# Patient Record
Sex: FEMALE | Race: ASIAN
[De-identification: names, ages, dates, MRNs, and addresses within clinical notes are randomized per-mention and may not be internally consistent; named-entity substitution may affect disease eponyms.]

---

## 2017-01-23 ENCOUNTER — HOSPITAL ENCOUNTER (OUTPATIENT)
Dept: HOSPITAL 45 - C.LAB1850 | Age: 82
Discharge: HOME | End: 2017-01-23
Attending: INTERNAL MEDICINE
Payer: COMMERCIAL

## 2017-01-23 DIAGNOSIS — E11.9: Primary | ICD-10-CM

## 2017-01-23 LAB — EST. AVERAGE GLUCOSE BLD GHB EST-MCNC: 128 MG/DL

## 2017-03-07 ENCOUNTER — HOSPITAL ENCOUNTER (OUTPATIENT)
Dept: HOSPITAL 45 - C.MAMM | Age: 82
Discharge: HOME | End: 2017-03-07
Attending: OBSTETRICS & GYNECOLOGY
Payer: COMMERCIAL

## 2017-03-07 DIAGNOSIS — Z12.31: Primary | ICD-10-CM

## 2017-03-08 NOTE — MAMMOGRAPHY REPORT
BILATERAL DIGITAL SCREENING MAMMOGRAM WITH CAD: 3/7/2017

CLINICAL HISTORY: Routine screening examination.  





TECHNIQUE: Bilateral CC and MLO views were obtained.  Current study was also evaluated with a Comput
er Aided Detection (CAD) system.  



COMPARISON: Comparison is made to exams dated:  3/4/2016 mammogram, 3/3/2015 mammogram, 10/3/2013 ma
mmogram - [facility], 9/22/2008, 9/23/2009, and 9/26/2011 mammogram - [facility].   



BREAST COMPOSITION:  There are scattered areas of fibroglandular density in both breasts.  



FINDINGS: The parenchyma pattern is similar to prior exams.  A prominent right axillary lymph node i
s again seen, similar in size comparing to the 2013 mammogram and previously sampled with fine-needl
e aspiration which yielded benign cytology.  No new suspicious mass, architectural distortion or clu
ster of microcalcifications is seen.  



IMPRESSION:  ACR BI-RADS CATEGORY 1: NEGATIVE

There is no mammographic evidence of malignancy. A 1 year screening mammogram is recommended.  The p
atient will receive written notification of the results.  





Approximately 10% of breast cancers are not detected with mammography. A negative mammographic repor
t should not delay biopsy if a clinically suggestive mass is present.



Denisse Anne M.D.          

ay/:3/7/2017 15:25:20  



Imaging Technologist: Maxine PECK)(RHIANNON), [facility]

letter sent: Normal 1/2  

BI-RADS Code: ACR BI-RADS Category 1: Negative

## 2017-05-09 ENCOUNTER — HOSPITAL ENCOUNTER (OUTPATIENT)
Dept: HOSPITAL 45 - C.LAB1850 | Age: 82
Discharge: HOME | End: 2017-05-09
Attending: INTERNAL MEDICINE
Payer: COMMERCIAL

## 2017-05-09 DIAGNOSIS — E11.9: Primary | ICD-10-CM

## 2017-05-09 LAB
ALT SERPL-CCNC: 19 U/L (ref 12–78)
ANION GAP SERPL CALC-SCNC: 5 MMOL/L (ref 3–11)
APPEARANCE UR: CLEAR
AST SERPL-CCNC: 13 U/L (ref 15–37)
BASOPHILS # BLD: 0.02 K/UL (ref 0–0.2)
BASOPHILS NFR BLD: 0.4 %
BILIRUB UR-MCNC: (no result) MG/DL
BUN SERPL-MCNC: 13 MG/DL (ref 7–18)
BUN/CREAT SERPL: 27.5 (ref 10–20)
CALCIUM SERPL-MCNC: 8.9 MG/DL (ref 8.5–10.1)
CHLORIDE SERPL-SCNC: 99 MMOL/L (ref 98–107)
CHOLEST/HDLC SERPL: 3.3 {RATIO}
CO2 SERPL-SCNC: 31 MMOL/L (ref 21–32)
COLOR UR: YELLOW
COMPLETE: YES
CREAT SERPL-MCNC: 0.48 MG/DL (ref 0.6–1.2)
EOSINOPHIL NFR BLD AUTO: 198 K/UL (ref 130–400)
EST. AVERAGE GLUCOSE BLD GHB EST-MCNC: 137 MG/DL
GLUCOSE SERPL-MCNC: 117 MG/DL (ref 70–99)
GLUCOSE UR QL: 71 MG/DL
HCT VFR BLD CALC: 42.3 % (ref 37–47)
IG%: 0 %
IMM GRANULOCYTES NFR BLD AUTO: 41.1 %
KETONES UR QL STRIP: 144 MG/DL
LYMPHOCYTES # BLD: 2.33 K/UL (ref 1.2–3.4)
MANUAL MICROSCOPIC REQUIRED?: NO
MCH RBC QN AUTO: 29 PG (ref 25–34)
MCHC RBC AUTO-ENTMCNC: 33.8 G/DL (ref 32–36)
MCV RBC AUTO: 85.8 FL (ref 80–100)
MONOCYTES NFR BLD: 13.8 %
NEUTROPHILS # BLD AUTO: 0.9 %
NEUTROPHILS NFR BLD AUTO: 43.8 %
NITRITE UR QL STRIP: (no result)
NITRITE UR QL STRIP: 85 MG/DL (ref 0–150)
PH UR STRIP: 7 [PH] (ref 4.5–7.5)
PH UR: 232 MG/DL (ref 0–200)
PMV BLD AUTO: 9.4 FL (ref 7.4–10.4)
POTASSIUM SERPL-SCNC: 4.1 MMOL/L (ref 3.5–5.1)
RBC # BLD AUTO: 4.93 M/UL (ref 4.2–5.4)
REVIEW REQ?: NO
SODIUM SERPL-SCNC: 135 MMOL/L (ref 136–145)
SP GR UR STRIP: 1.01 (ref 1–1.03)
TSH SERPL-ACNC: 3.25 UIU/ML (ref 0.3–4.5)
URINE EPITHELIAL CELL AUTO: (no result) /LPF (ref 0–5)
UROBILINOGEN UR-MCNC: (no result) MG/DL
VERY LOW DENSITY LIPOPROT CALC: 17 MG/DL
WBC # BLD AUTO: 5.67 K/UL (ref 4.8–10.8)

## 2017-08-22 ENCOUNTER — HOSPITAL ENCOUNTER (OUTPATIENT)
Dept: HOSPITAL 45 - X.SURG | Age: 82
Discharge: HOME | End: 2017-08-22
Attending: OPHTHALMOLOGY
Payer: COMMERCIAL

## 2017-08-22 VITALS — SYSTOLIC BLOOD PRESSURE: 145 MMHG | DIASTOLIC BLOOD PRESSURE: 56 MMHG | HEART RATE: 80 BPM | OXYGEN SATURATION: 97 %

## 2017-08-22 VITALS
HEIGHT: 62.01 IN | HEIGHT: 62.01 IN | WEIGHT: 144.8 LBS | BODY MASS INDEX: 26.31 KG/M2 | WEIGHT: 144.8 LBS | BODY MASS INDEX: 26.31 KG/M2

## 2017-08-22 DIAGNOSIS — I10: ICD-10-CM

## 2017-08-22 DIAGNOSIS — H26.9: Primary | ICD-10-CM

## 2017-08-22 DIAGNOSIS — E11.9: ICD-10-CM

## 2017-08-22 DIAGNOSIS — E03.9: ICD-10-CM

## 2017-08-22 DIAGNOSIS — E78.5: ICD-10-CM

## 2017-08-22 DIAGNOSIS — E78.00: ICD-10-CM

## 2017-08-22 RX ADMIN — PHENYLEPHRINE HYDROCHLORIDE SCH DROP: 25 SOLUTION/ DROPS OPHTHALMIC at 08:02

## 2017-08-22 RX ADMIN — CYCLOPENTOLATE HYDROCHLORIDE SCH DROP: 10 SOLUTION/ DROPS OPHTHALMIC at 07:59

## 2017-08-22 RX ADMIN — CYCLOPENTOLATE HYDROCHLORIDE SCH DROP: 10 SOLUTION/ DROPS OPHTHALMIC at 08:04

## 2017-08-22 RX ADMIN — PHENYLEPHRINE HYDROCHLORIDE SCH DROP: 25 SOLUTION/ DROPS OPHTHALMIC at 07:57

## 2017-08-22 RX ADMIN — KETOROLAC TROMETHAMINE SCH DROP: 5 SOLUTION OPHTHALMIC at 08:05

## 2017-08-22 RX ADMIN — TROPICAMIDE SCH DROP: 10 SOLUTION/ DROPS OPHTHALMIC at 07:58

## 2017-08-22 RX ADMIN — KETOROLAC TROMETHAMINE SCH DROP: 5 SOLUTION OPHTHALMIC at 08:00

## 2017-08-22 RX ADMIN — TROPICAMIDE SCH DROP: 10 SOLUTION/ DROPS OPHTHALMIC at 08:03

## 2017-08-22 RX ADMIN — GATIFLOXACIN SCH DROP: 5 SOLUTION/ DROPS OPHTHALMIC at 08:01

## 2017-08-22 RX ADMIN — GATIFLOXACIN SCH DROP: 5 SOLUTION/ DROPS OPHTHALMIC at 08:14

## 2017-08-22 NOTE — DISCHARGE INSTRUCTIONS-SURGCTR
Discharge Instructions


Date of Service


Aug 22, 2017.





Visit


Reason for Visit:  Left Cataract





Discharge


Discharge Diagnosis / Problem:  cataract





Discharge Goals


Goal(s):  Improve function





Activity Recommendations


Activity Limitations:  per Instructions/Follow-up section





Anesthesia


.





Post Anesthesia Instructions:





If you have had General Anesthesia or IV Sedation:





*  Do not drive today.


*  Resume driving when surgeon permits.


*  Do not make important decisions or sign legal documents today.


*  Call surgeon for:





   1.  Temperature elevations greater than 101 degrees F.


   2.  Uncontrollable pain.


   3.  Excessive bleeding.


   4.  Persistent nausea and vomiting.


   5.  Medication intolerance (nausea, vomiting or rash).





*  For nausea and vomiting use only clear liquids such as: tea, soda, bouillon 

until nausea subsides, then gradually increase diet as tolerated.





*  If you have any concerns or questions, call your surgeon's office.  If 

physician is unavailable and it is an emergency, call 911 or go to the nearest 

emergency room.





.





Instructions / Follow-Up


Instructions / Follow-Up





ACTIVITY RECOMMENDATIONS:





*  No strenuous lifting, jogging or running for 4 days





*  No swimming or yard work for 1 week.





*  Limited bending is permitted, such as putting on shoes.








RETURN TO SCHOOL/WORK:





No work until seen by physician in office.








MEDICATIONS:





Resume previous medications unless instructed otherwise by your surgeon.  This


includes eye drops for glaucoma.





Zymaxid/Gatifloxacin (tan cap) - one drop every 2 hours until bedtime





Nevanac/Ilevro/Prolensa/Ketorolac (gray cap) - one drop every 4 hours until 

bedtime





Prednisolone/Durezol (white/pink cap, SHAKE WELL) - one drop every 2 hours 

until bedtime





Starting tomorrow - all 3 drops every 4 hours until seen in the office





Optive drops - as needed for discomfort








SPECIAL CARE INSTRUCTIONS:





*  Wear eyeshield when sleeping, for four nights.





*  You may wear your own glasses or sunglasses while awake.





*  You may read or watch TV





*  You may shower and wash your face, but be gentle around the eye and pat dry.





*  Blurry vision and mild irritation are normal.





*  Call office if pain is more severe or vision becomes dark at [phone].








FOLLOW UP VISIT:





Follow-up with Dr Gould tomorrow.





Diet Recommendations


Home Diet:  resume previous diet





Procedures


Procedures Performed:  


Left Cataract Phacoemulsification With Intraocular Lens Implant





Pending Studies


Studies pending at discharge:  no





Medical Emergencies








.


Who to Call and When:





Medical Emergencies:  If at any time you feel your situation is an emergency, 

please call 911 immediately.





.





Non-Emergent Contact


Non-Emergency issues call your:  Ophthalmologist





.


.








"Provider Documentation" section prepared by Alireza Gould.








.

## 2017-08-22 NOTE — ANESTHESIA PROGRESS NT - [facility]
Anesthesia Post Op Note


Date & Time


Aug 22, 2017 at 09:13





Vital Signs


Pain Intensity:  0





Vital Signs Past 12 Hours








  Date Time  Temp Pulse Resp B/P (MAP) Pulse Ox O2 Delivery O2 Flow Rate FiO2


 


8/22/17 07:45 36.7 89 16 169/80 (109) 99 Room Air  











Notes


Mental Status:  alert / awake / arousable, participated in evaluation


Pt Amnestic to Procedure:  Yes


Nausea / Vomiting:  adequately controlled


Pain:  adequately controlled


Airway Patency, RR, SpO2:  stable & adequate


BP & HR:  stable & adequate


Hydration State:  stable & adequate


Anesthetic Complications:  no major complications apparent

## 2017-08-22 NOTE — HISTORY & PHYSICAL BRIDGE - [facility]
H&P Re-Evaluation


Bridge Note:


I have examined the patient, reviewed the History & Physical and in the 

interval since the performance of the History & Physical I have noted the 

following changes of clinical significance:


Diagnosis: Left Cataract





Procedure:  Left Cataract Removal with Lens Implant








 No changes noted

## 2017-08-22 NOTE — [facility] OPERATIVE REPORT
Operative Report


Date of Service


Aug 22, 2017.





Operative Report


1.  PREOPERATIVE DIAGNOSIS:  Cataract of the left eye.





2.  POSTOPERATIVE DIAGNOSIS:  Same.





3.  PROCEDURE:  Phacoemulsification with intraocular lens implantation of the 

left eye.  





SURGEON:  Dr. Alireza Gould.  ANESTHESIA:  Topical Lidocaine gel, 1% Non-

Preserved intracameral Lidocaine, and monitored intravenous sedation.  





INDICATIONS FOR THE PROCEDURE:  The patient is a 83 - year-old female with a 

history of cataract of the left eye causing significant visual impairment.  The 

details of the proposed procedure were explained to the patient who asked 

appropriate questions and following discussion of all risks, benefits and 

alternatives agreed to have the procedure done.  





4.  OPERATION AND FINDINGS:  





DESCRIPTION OF PROCEDURE:  After informed consent was obtained, the patient was 

brought to the Operating Room at the [facility].  The 

patient was placed in a supine position and then the left eye was prepped and 

draped in the usual sterile fashion for intraocular surgery.  A drop of topical 

Lidocaine gel was placed in the operative eye.  A wire lid speculum was then 

placed in the fornices.  A corneal paracentesis was then created temporally.  

The Non-Preserved Lidocaine was then instilled into the anterior chamber.  The 

anterior chamber was then pressurized with viscoelastic.  A 2.0 mm clear 

corneal incision was then created temporally.  A cystotome was inserted into 

the anterior chamber and used to create a tear in the anterior lens capsule.  

This capsular tear was then used to create a small flap and the flap was 

dragged in a counterclockwise direction in order to create a continuous 

curvilinear capsulorrhexis.  Hydrodissection was accomplished with balanced 

salt solution.  Phacoemulsification of the lens nucleus was then performed in a 

standard divide-and-conquer technique.  The phaco time was 26 seconds with an 

average power of 13 %.  The remaining cortical material was removed using 

irrigation aspiration.  The capsular bag was then filled with viscoelastic.  A 

Bausch & Lomb MI60L +16.5 diopters lens was then loaded into the injector and 

injected into the capsular bag.  The remaining viscoelastic was removed with 

the irrigation aspiration handpiece.  The wound was hydrated and then checked 

and found to be watertight.  The intraocular pressure was checked and found to 

be adequate.  The wire lid speculum was removed and the patient's face was 

cleaned and dried.  TobraDex ointment was placed in the inferior fornix.  The 

patient was discharged to the Recovery Room having tolerated the procedure 

well.  There were no complications.  The patient will be seen tomorrow in the 

office for follow-up.


I attest to the content of the Intraoperative Record and any orders documented 

therein.  Any exceptions are noted below.

## 2017-09-12 ENCOUNTER — HOSPITAL ENCOUNTER (OUTPATIENT)
Dept: HOSPITAL 45 - X.SURG | Age: 82
Discharge: HOME | End: 2017-09-12
Attending: OPHTHALMOLOGY
Payer: COMMERCIAL

## 2017-09-12 VITALS
BODY MASS INDEX: 26.22 KG/M2 | HEIGHT: 62.01 IN | WEIGHT: 144.29 LBS | HEIGHT: 62.01 IN | BODY MASS INDEX: 26.22 KG/M2 | WEIGHT: 144.29 LBS

## 2017-09-12 VITALS — OXYGEN SATURATION: 99 % | HEART RATE: 75 BPM | SYSTOLIC BLOOD PRESSURE: 129 MMHG | DIASTOLIC BLOOD PRESSURE: 76 MMHG

## 2017-09-12 VITALS — TEMPERATURE: 97.52 F

## 2017-09-12 DIAGNOSIS — E78.00: ICD-10-CM

## 2017-09-12 DIAGNOSIS — E03.9: ICD-10-CM

## 2017-09-12 DIAGNOSIS — I87.2: ICD-10-CM

## 2017-09-12 DIAGNOSIS — H40.9: ICD-10-CM

## 2017-09-12 DIAGNOSIS — E11.9: ICD-10-CM

## 2017-09-12 DIAGNOSIS — Z79.899: ICD-10-CM

## 2017-09-12 DIAGNOSIS — I10: ICD-10-CM

## 2017-09-12 DIAGNOSIS — H26.9: Primary | ICD-10-CM

## 2017-09-12 RX ADMIN — KETOROLAC TROMETHAMINE SCH DROP: 5 SOLUTION OPHTHALMIC at 12:08

## 2017-09-12 RX ADMIN — CYCLOPENTOLATE HYDROCHLORIDE SCH DROP: 10 SOLUTION/ DROPS OPHTHALMIC at 12:12

## 2017-09-12 RX ADMIN — KETOROLAC TROMETHAMINE SCH DROP: 5 SOLUTION OPHTHALMIC at 12:13

## 2017-09-12 RX ADMIN — PHENYLEPHRINE HYDROCHLORIDE SCH DROP: 25 SOLUTION/ DROPS OPHTHALMIC at 12:10

## 2017-09-12 RX ADMIN — TROPICAMIDE SCH DROP: 10 SOLUTION/ DROPS OPHTHALMIC at 12:11

## 2017-09-12 RX ADMIN — PHENYLEPHRINE HYDROCHLORIDE SCH DROP: 25 SOLUTION/ DROPS OPHTHALMIC at 12:05

## 2017-09-12 RX ADMIN — CYCLOPENTOLATE HYDROCHLORIDE SCH DROP: 10 SOLUTION/ DROPS OPHTHALMIC at 12:07

## 2017-09-12 RX ADMIN — TROPICAMIDE SCH DROP: 10 SOLUTION/ DROPS OPHTHALMIC at 12:06

## 2017-09-12 RX ADMIN — GATIFLOXACIN SCH DROP: 5 SOLUTION/ DROPS OPHTHALMIC at 12:09

## 2017-09-12 RX ADMIN — GATIFLOXACIN SCH DROP: 5 SOLUTION/ DROPS OPHTHALMIC at 12:20

## 2017-09-12 NOTE — [facility] OPERATIVE REPORT
Operative Report


Date of Service


Sep 12, 2017.





Operative Report


1.  PREOPERATIVE DIAGNOSIS:  Cataract of the right eye.





2.  POSTOPERATIVE DIAGNOSIS:  Same.





3.  PROCEDURE:  Phacoemulsification with intraocular lens implantation of the 

right eye.  





SURGEON:  Dr. Alireza Gould.  ANESTHESIA:  Topical Lidocaine gel, 1% Non-

Preserved intracameral Lidocaine, and monitored intravenous sedation.  





INDICATIONS FOR THE PROCEDURE:  The patient is a 83 - year-old female with a 

history of cataract of the right eye causing significant visual impairment.  

The details of the proposed procedure were explained to the patient who asked 

appropriate questions and following discussion of all risks, benefits and 

alternatives agreed to have the procedure done.  





4.  OPERATION AND FINDINGS:  





DESCRIPTION OF PROCEDURE:  After informed consent was obtained, the patient was 

brought to the Operating Room at the [facility].  The 

patient was placed in a supine position and then the right eye was prepped and 

draped in the usual sterile fashion for intraocular surgery.  A drop of topical 

Lidocaine gel was placed in the operative eye.  A wire lid speculum was then 

placed in the fornices.  A corneal paracentesis was then created temporally.  

The Non-Preserved Lidocaine was then instilled into the anterior chamber.  The 

anterior chamber was then pressurized with viscoelastic.  A 2.0 mm clear 

corneal incision was then created temporally.  A cystotome was inserted into 

the anterior chamber and used to create a tear in the anterior lens capsule.  

This capsular tear was then used to create a small flap and the flap was 

dragged in a counterclockwise direction in order to create a continuous 

curvilinear capsulorrhexis.  Hydrodissection was accomplished with balanced 

salt solution.  Phacoemulsification of the lens nucleus was then performed in a 

standard divide-and-conquer technique.  The phaco time was 28 seconds with an 

average power of 19 %.  The remaining cortical material was removed using 

irrigation aspiration.  The capsular bag was then filled with viscoelastic.  A 

Bausch & Lomb MI60L +16.5 diopters lens was then loaded into the injector and 

injected into the capsular bag.  The remaining viscoelastic was removed with 

the irrigation aspiration handpiece.  The wound was hydrated and then checked 

and found to be watertight.  The intraocular pressure was checked and found to 

be adequate.  The wire lid speculum was removed and the patient's face was 

cleaned and dried.  TobraDex ointment was placed in the inferior fornix.  The 

patient was discharged to the Recovery Room having tolerated the procedure 

well.  There were no complications.  The patient will be seen tomorrow in the 

office for follow-up.


I attest to the content of the Intraoperative Record and any orders documented 

therein.  Any exceptions are noted below.

## 2017-09-12 NOTE — DISCHARGE INSTRUCTIONS-SURGCTR
Discharge Instructions


Date of Service


Sep 12, 2017.





Visit


Reason for Visit:  Right Cataract





Discharge


Discharge Diagnosis / Problem:  cataract





Discharge Goals


Goal(s):  Improve function





Activity Recommendations


Activity Limitations:  per Instructions/Follow-up section





Anesthesia


.





Post Anesthesia Instructions:





If you have had General Anesthesia or IV Sedation:





*  Do not drive today.


*  Resume driving when surgeon permits.


*  Do not make important decisions or sign legal documents today.


*  Call surgeon for:





   1.  Temperature elevations greater than 101 degrees F.


   2.  Uncontrollable pain.


   3.  Excessive bleeding.


   4.  Persistent nausea and vomiting.


   5.  Medication intolerance (nausea, vomiting or rash).





*  For nausea and vomiting use only clear liquids such as: tea, soda, bouillon 

until nausea subsides, then gradually increase diet as tolerated.





*  If you have any concerns or questions, call your surgeon's office.  If 

physician is unavailable and it is an emergency, call 911 or go to the nearest 

emergency room.





.





Instructions / Follow-Up


Instructions / Follow-Up





ACTIVITY RECOMMENDATIONS:





*  No strenuous lifting, jogging or running for 4 days





*  No swimming or yard work for 1 week.





*  Limited bending is permitted, such as putting on shoes.








RETURN TO SCHOOL/WORK:





No work until seen by physician in office.








MEDICATIONS:





Resume previous medications unless instructed otherwise by your surgeon.  This


includes eye drops for glaucoma.





Zymaxid/Gatifloxacin (tan cap) - one drop every 2 hours until bedtime





Nevanac/Ilevro/Prolensa/Ketorolac (gray cap) - one drop every 4 hours until 

bedtime





Prednisolone/Durezol (white/pink cap, SHAKE WELL) - one drop every 2 hours 

until bedtime





Starting tomorrow - all 3 drops every 4 hours until seen in the office





Optive drops - as needed for discomfort








SPECIAL CARE INSTRUCTIONS:





*  Wear eyeshield when sleeping, for four nights.





*  You may wear your own glasses or sunglasses while awake.





*  You may read or watch TV





*  You may shower and wash your face, but be gentle around the eye and pat dry.





*  Blurry vision and mild irritation are normal.





*  Call office if pain is more severe or vision becomes dark at [phone].








FOLLOW UP VISIT:





Follow-up with Dr Gould tomorrow.





Diet Recommendations


Home Diet:  resume previous diet





Procedures


Procedures Performed:  


Right Cataract Phacoemulsification With Intraocular Lens Implant





Pending Studies


Studies pending at discharge:  no





Medical Emergencies








.


Who to Call and When:





Medical Emergencies:  If at any time you feel your situation is an emergency, 

please call 911 immediately.





.





Non-Emergent Contact


Non-Emergency issues call your:  Ophthalmologist





.


.








"Provider Documentation" section prepared by Alireza Gould.








.

## 2017-09-12 NOTE — ANESTHESIA PROGRESS NT - [facility]
Anesthesia Post Op Note


Date & Time


Sep 12, 2017 at 13:47





Vital Signs


Pain Intensity:  0





Vital Signs Past 12 Hours








  Date Time  Temp Pulse Resp B/P (MAP) Pulse Ox O2 Delivery O2 Flow Rate FiO2


 


9/12/17 13:24 36.4 78 16 155/76 (102) 99 Room Air  


 


9/12/17 11:59 37 68 18 163/83 (109) 96 Room Air  











Notes


Mental Status:  alert / awake / arousable, participated in evaluation


Pt Amnestic to Procedure:  Yes


Nausea / Vomiting:  adequately controlled


Pain:  adequately controlled


Airway Patency, RR, SpO2:  stable & adequate


BP & HR:  stable & adequate


Hydration State:  stable & adequate


Anesthetic Complications:  no major complications apparent

## 2017-10-25 ENCOUNTER — HOSPITAL ENCOUNTER (OUTPATIENT)
Dept: HOSPITAL 45 - C.LAB1850 | Age: 82
Discharge: HOME | End: 2017-10-25
Attending: INTERNAL MEDICINE
Payer: COMMERCIAL

## 2017-10-25 DIAGNOSIS — E11.9: Primary | ICD-10-CM

## 2017-10-25 LAB — EST. AVERAGE GLUCOSE BLD GHB EST-MCNC: 140 MG/DL

## 2018-01-23 ENCOUNTER — HOSPITAL ENCOUNTER (OUTPATIENT)
Dept: HOSPITAL 45 - C.LAB1850 | Age: 83
Discharge: HOME | End: 2018-01-23
Attending: INTERNAL MEDICINE
Payer: COMMERCIAL

## 2018-01-23 DIAGNOSIS — E11.9: Primary | ICD-10-CM

## 2018-01-23 LAB
ALT SERPL-CCNC: 22 U/L (ref 12–78)
AST SERPL-CCNC: 16 U/L (ref 15–37)
BASOPHILS # BLD: 0.03 K/UL (ref 0–0.2)
BASOPHILS NFR BLD: 0.5 %
BUN SERPL-MCNC: 11 MG/DL (ref 7–18)
CALCIUM SERPL-MCNC: 9 MG/DL (ref 8.5–10.1)
CO2 SERPL-SCNC: 29 MMOL/L (ref 21–32)
CREAT SERPL-MCNC: 0.54 MG/DL (ref 0.6–1.2)
CREAT UR-MCNC: 18.7 MG/DL
EOS ABS #: 0.03 K/UL (ref 0–0.5)
EOSINOPHIL NFR BLD AUTO: 210 K/UL (ref 130–400)
GLUCOSE SERPL-MCNC: 81 MG/DL (ref 70–99)
HBA1C MFR BLD: 6.5 % (ref 4.5–5.6)
HCT VFR BLD CALC: 42.5 % (ref 37–47)
HGB BLD-MCNC: 14.7 G/DL (ref 12–16)
IG#: 0.01 K/UL (ref 0–0.02)
IMM GRANULOCYTES NFR BLD AUTO: 46.4 %
KETONES UR QL STRIP: 125 MG/DL
LYMPHOCYTES # BLD: 2.57 K/UL (ref 1.2–3.4)
MCH RBC QN AUTO: 29.3 PG (ref 25–34)
MCHC RBC AUTO-ENTMCNC: 34.6 G/DL (ref 32–36)
MCV RBC AUTO: 84.7 FL (ref 80–100)
MONO ABS #: 0.63 K/UL (ref 0.11–0.59)
MONOCYTES NFR BLD: 11.4 %
NEUT ABS #: 2.27 K/UL (ref 1.4–6.5)
NEUTROPHILS # BLD AUTO: 0.5 %
NEUTROPHILS NFR BLD AUTO: 41 %
PH UR: 205 MG/DL (ref 0–200)
PMV BLD AUTO: 9.7 FL (ref 7.4–10.4)
POTASSIUM SERPL-SCNC: 3.6 MMOL/L (ref 3.5–5.1)
RED CELL DISTRIBUTION WIDTH CV: 13.1 % (ref 11.5–14.5)
RED CELL DISTRIBUTION WIDTH SD: 40.3 FL (ref 36.4–46.3)
SODIUM SERPL-SCNC: 128 MMOL/L (ref 136–145)
WBC # BLD AUTO: 5.54 K/UL (ref 4.8–10.8)

## 2018-03-12 ENCOUNTER — HOSPITAL ENCOUNTER (OUTPATIENT)
Dept: HOSPITAL 45 - C.PAPS | Age: 83
Discharge: HOME | End: 2018-03-12
Attending: OBSTETRICS & GYNECOLOGY
Payer: COMMERCIAL

## 2018-03-12 DIAGNOSIS — Z01.419: Primary | ICD-10-CM

## 2018-03-13 ENCOUNTER — HOSPITAL ENCOUNTER (OUTPATIENT)
Dept: HOSPITAL 45 - C.MAMM | Age: 83
Discharge: HOME | End: 2018-03-13
Attending: OBSTETRICS & GYNECOLOGY
Payer: COMMERCIAL

## 2018-03-13 DIAGNOSIS — Z12.31: Primary | ICD-10-CM

## 2018-03-13 NOTE — MAMMOGRAPHY REPORT
BILATERAL DIGITAL SCREENING MAMMOGRAM TOMOSYNTHESIS WITH CAD: 3/13/2018

CLINICAL HISTORY: Routine screening.  Patient has no complaints.  





TECHNIQUE:  Breast tomosynthesis in addition to standard 2D mammography was performed. Current study 
was also evaluated with a Computer Aided Detection (CAD) system.  



COMPARISON: Comparison is made to exams dated:  3/7/2017 mammogram, 3/4/2016 mammogram, 2/27/2014 [place]
mogram, 11/27/2013 mammogram, and 10/3/2013 mammogram - [facility].   



BREAST COMPOSITION:  There are scattered areas of fibroglandular density in both breasts.  



FINDINGS:  The parenchymal pattern is unchanged. No developing mass, architectural distortion or clus
ter of suspicious microcalcifications is seen in either breast.  





IMPRESSION:  ACR BI-RADS CATEGORY 2: BENIGN

There is no mammographic evidence of malignancy. A 1 year screening mammogram is recommended.  The pa
tient will receive written notification of the results.  





Approximately 10% of breast cancers are not detected with mammography. A negative mammographic report
 should not delay biopsy if a clinically suggestive mass is present.



Denisse Anne M.D.          

ay/:3/13/2018 14:22:28  



Imaging Technologist: Sue PAVON(R)(M), [facility]

letter sent: Normal 1/2  

BI-RADS Code: ACR BI-RADS Category 2: Benign